# Patient Record
Sex: MALE | Race: BLACK OR AFRICAN AMERICAN | URBAN - METROPOLITAN AREA
[De-identification: names, ages, dates, MRNs, and addresses within clinical notes are randomized per-mention and may not be internally consistent; named-entity substitution may affect disease eponyms.]

---

## 2019-02-14 ENCOUNTER — OFFICE VISIT (OUTPATIENT)
Dept: FAMILY MEDICINE | Facility: CLINIC | Age: 29
End: 2019-02-14

## 2019-02-14 VITALS
RESPIRATION RATE: 12 BRPM | DIASTOLIC BLOOD PRESSURE: 84 MMHG | WEIGHT: 193.6 LBS | OXYGEN SATURATION: 98 % | SYSTOLIC BLOOD PRESSURE: 122 MMHG | BODY MASS INDEX: 25.66 KG/M2 | HEART RATE: 58 BPM | TEMPERATURE: 98.3 F | HEIGHT: 73 IN

## 2019-02-14 DIAGNOSIS — Z11.3 SCREEN FOR STD (SEXUALLY TRANSMITTED DISEASE): Primary | ICD-10-CM

## 2019-02-14 ASSESSMENT — MIFFLIN-ST. JEOR: SCORE: 1902.04

## 2019-02-14 NOTE — Clinical Note
I have completed my note, please review, add tie in statement and close encounter. Thanks! Hoyeon (Melody) KimEmail: [email or web address] [phone]

## 2019-02-15 LAB
C TRACH DNA SPEC QL NAA+PROBE: NEGATIVE
HBV SURFACE AB SERPL IA-ACNC: 10.59 M[IU]/ML
HBV SURFACE AG SERPL QL IA: NONREACTIVE
HCV AB SERPL QL IA: NONREACTIVE
HIV 1+2 AB+HIV1 P24 AG SERPL QL IA: NONREACTIVE
N GONORRHOEA DNA SPEC QL NAA+PROBE: NEGATIVE
SPECIMEN SOURCE: NORMAL
SPECIMEN SOURCE: NORMAL
T PALLIDUM AB SER QL: NONREACTIVE

## 2019-02-15 NOTE — PROGRESS NOTES
"MEDICINE NOTE    SUBJECTIVE:      Mr. William Swann is a 28 year old male with no significant past medical history or active medical problems presenting today for an STI / STD screen. Patient reports becoming recently sexually active in September 2018; he was last sexually active 14 years ago. STI/STD testing at that time was negative. Recent at-home HIV testing (December, 2018) was also negative. Patient has had 7 lifetime partners and engages in sex with men only. He reports safe sex using condoms consistently. Despite this, Mr. Swann reports being \"paranoid\" and would like to get STI/STD testing to obtain a baseline.     Mr. Swann is reportedly asymptomatic. No pertinent positive symptoms.      Meds:  Now foods digestive enzyme; Iron Supplements; Probiotics; Multivitamins (1/day)    REVIEW OF SYSTEMS:  Gen: no fevers, night sweats or weight change  Eyes: no vision change, diplopia or red eyes  Ears, Noses, Mouth, Throat: no hearing change, no epistaxis or nasal discharge, no oral lesions, throat clear  Cardiac: no chest pain, palpitations, or pain with walking  Lungs: no dyspnea, cough, or shortness of breath  GI: no nausea, vomiting, diarrhea or constipation, no abdominal pain  : no change in urine, hematuria, or sexual dysfunction  Musculoskeletal: no joint or muscle pain or swelling  Skin: no concerning lesions or moles  Neuro: no loss of strength or sensation, no numbness, occasional lower extremity tingling, no tremor  Endo: no polyuria or polydipsia, no temperature intolerance  Heme/Lymph: no concerning bumps, no bleeding problems  Allergy: no environmental allergies; seafood allergy  Psych: no depression or anxiety    No past medical history.    Surgical history: wisdom teeth extraction    Family history: none reported    Social History     Socioeconomic History     Marital status: Single   Occupational History     Not on file   Tobacco Use     Smoking status: Never   Substance and Sexual Activity     " "Alcohol use: 1x every few months     Drug use: none     Sexual activity: Active; MSM       OBJECTIVE:  Physical Exam:  /84 (BP Location: Left arm, Patient Position: Chair, Cuff Size: Adult Regular)   Pulse 58   Temp 98.3  F (36.8  C)   Resp 12   Ht 1.854 m (6' 1\")   Wt 87.8 kg (193 lb 9.6 oz)   SpO2 98%   BMI 25.54 kg/m    Constitutional: no distress, comfortable, pleasant   Eyes: anicteric, normal extra-ocular movements   Ears, Nose and Throat: throat clear, neck supple with full range of motion  Cardiovascular: regular rate and rhythm, normal S1 and S2, no murmurs, rubs or gallops   Respiratory: non-labored breathing  Gastrointestinal: positive bowel sounds, nontender  Musculoskeletal: full range of motion, no edema   Skin: no concerning lesions, no jaundice   Neurological: cranial nerves grossly intact, normal strength and sensation, normal gait, no tremor   Psychological: appropriate mood, congruent affect   Lymphatic: no cervical  lymphadenopathy    ASSESSMENT/PLAN:  Diagnoses and all orders for this visit: STD/STI screen    Patent presented requesting STD/STI screening after recently becoming sexually active. He regularly uses protection (condoms), but is a higher risk patient due to MSM practice.     Screen for STD (sexually transmitted disease)  -     Neisseria gonorrhoeae PCR  -     Chlamydia trachomatis PCR  -     Hepatitis B Surface Antibody  -     Hepatitis B surface antigen  -     Hepatitis C antibody  -     Treponema Abs w Reflex to RPR and Titer  -     Rapid HIV 1 and 2 Antigen Antibody      Med Clinician: Imtiaz Bedolla MS3  Preceptor: Dr. Gunnar Castillo MD    In supervising the medical student, I repeated the exam documented above.  I have reviewed and verified the student s documentation.  Supervising Provider: Anna Maher        "

## 2019-02-15 NOTE — PROGRESS NOTES
Patient Visit Summary    Patient Name: William Swann  MRN: [id number]    Date of Visit: 2/14/2019    Principle Diagnosis: STI/STD Screen    Physician's Recommendations/Instructions: None    Lab Tests Performed:   HepB/C  STI/STD Test for HIV, Chlamydia, Gonorrhea, Syphilis     Follow Up/Results: Clinic will call with lab results.     Referrals and Instructions: None    Physician: Imtiaz Bedolla MS3 and Dr. Gunnar Castillo

## 2019-02-15 NOTE — NURSING NOTE
Patient presented to the clinic with intent to receive STI screening. The patient reports no further complaints. The patient is currently taking enzymes and iron supplements.

## 2019-02-16 NOTE — PROGRESS NOTES
"[facility] Progress Note    Chief complaint: STI/STD screening    Last date of full med (Med Refill only):    Subjective:  Condition 1: STI/STD Screening  DONAVAN is a 28-years-old male who presents to the clinic for STI/STD screening. The patient did not have STD in the past. He was concerned about STD, so he would like to get STI/STD screening to obtain a baseline. The patient states that be recently became sexually active in September 2018. He was last sexually active 14 years ago and STI/STD screening was negative at that time. He currently has no sexual partner. He has had 7 partners in his lifetime and engages in sex with men. He reports using condoms as protection. In December 2018, he did home HIV testing and the result was negative.     He is up-to-date on immunizations but did not receive a flu shot this year. He has no past medical history and no family history of any chronic conditions. The patient is currently taking an iron supplement, Now Foods digestive enzyme, and probiotics. He takes 1 tablet of iron supplement once daily for general energy. He does not remember the strength of the iron supplement and started to take it last January. He takes 2 capsules of Now Foods enzyme daily for digestion. He started to take it 2 years ago. He takes 1-5 capsules of probiotics daily for digestion. He started to take it 2 years ago. He used to take multivitamins (one a day), but he did not take it for about a month.      Caffeine use: none  Tobacco use: none  Alcohol use: once every few months, only one drink  Illicit drugs use: none        Current Outpatient Medications   Medication Sig Dispense Refill     Ferrous Sulfate (IRON SUPPLEMENT PO) Take 1 tablet by mouth once daily           Objective:   /84 (BP Location: Left arm, Patient Position: Chair, Cuff Size: Adult Regular)   Pulse 58   Temp 98.3  F (36.8  C)   Resp 12   Ht 1.854 m (6' 1\")   Wt 87.8 kg (193 lb 9.6 oz)   SpO2 98%   BMI 25.54 kg/m  "     Assessment:     Condition 1- STI/STD screening  DTP: N/A  The patient has no drug therapy problems at this time and only wants to have STI/STD screening after recently becoming sexually active.     Plan:  1. Perform STD screening for gonorrhea, chlamydia, syphilis, Hepatitis B, Hepatitis C, and HIV.     Pharmacy Follow-Up Plan (Method, Date, Parameters):   - Follow-up via phone call with med clinician to share lab results on Monday (2/18/19).    PharmCare Clinician: Hoyeon (Melody) Kim   Pharmacy Preceptor: Tayler Hu.     _____________________________  Preceptor Use Only:  In supervising the student, I have reviewed and verified the student's documentation and found it to be correct and complete.   Preceptor Signature: Emily Puentes PharmD

## 2019-02-20 ENCOUNTER — TELEPHONE (OUTPATIENT)
Dept: FAMILY MEDICINE | Facility: CLINIC | Age: 29
End: 2019-02-20

## 2019-02-20 NOTE — TELEPHONE ENCOUNTER
Type: Outgoing Phone Call to patient  Contact: Patient, # [phone]  Outcome of the Call: No Answer.   Purpose: I was calling to inform the patient of his lab reports. I will try again within 24 hours.     -Imtiaz Bedolla, MS3

## 2019-02-21 ENCOUNTER — TELEPHONE (OUTPATIENT)
Dept: FAMILY MEDICINE | Facility: CLINIC | Age: 29
End: 2019-02-21

## 2019-02-22 NOTE — TELEPHONE ENCOUNTER
Mr. Swann called for the results of his STI tests. I informed him that all were negative, and that his positive HBV surface antibody was due to a vaccination against HBV and not active disease because of his negative antigen.

## 2019-07-11 ENCOUNTER — OFFICE VISIT (OUTPATIENT)
Dept: FAMILY MEDICINE | Facility: CLINIC | Age: 29
End: 2019-07-11

## 2019-07-11 VITALS
HEIGHT: 73 IN | HEART RATE: 54 BPM | DIASTOLIC BLOOD PRESSURE: 78 MMHG | BODY MASS INDEX: 26.92 KG/M2 | OXYGEN SATURATION: 95 % | TEMPERATURE: 98.5 F | SYSTOLIC BLOOD PRESSURE: 115 MMHG | WEIGHT: 203.1 LBS | RESPIRATION RATE: 16 BRPM

## 2019-07-11 DIAGNOSIS — Z11.3 SCREEN FOR STD (SEXUALLY TRANSMITTED DISEASE): ICD-10-CM

## 2019-07-11 DIAGNOSIS — N10 PYELONEPHRITIS, ACUTE: ICD-10-CM

## 2019-07-11 DIAGNOSIS — R30.0 DYSURIA: Primary | ICD-10-CM

## 2019-07-11 LAB
ALBUMIN UR-MCNC: NEGATIVE MG/DL
APPEARANCE UR: CLEAR
BILIRUB UR QL STRIP: NEGATIVE
COLOR UR AUTO: ABNORMAL
GLUCOSE UR STRIP-MCNC: NEGATIVE MG/DL
HGB UR QL STRIP: ABNORMAL
KETONES UR STRIP-MCNC: NEGATIVE MG/DL
LEUKOCYTE ESTERASE UR QL STRIP: ABNORMAL
MUCOUS THREADS #/AREA URNS LPF: PRESENT /LPF
NITRATE UR QL: NEGATIVE
PH UR STRIP: 7.5 PH (ref 5–7)
RBC #/AREA URNS AUTO: 1 /HPF (ref 0–2)
SOURCE: ABNORMAL
SP GR UR STRIP: 1 (ref 1–1.03)
UROBILINOGEN UR STRIP-MCNC: NORMAL MG/DL (ref 0–2)
WBC #/AREA URNS AUTO: 10 /HPF (ref 0–5)

## 2019-07-11 RX ORDER — CIPROFLOXACIN 500 MG/1
500 TABLET, FILM COATED ORAL 2 TIMES DAILY
Qty: 14 TABLET | Refills: 0 | Status: SHIPPED | OUTPATIENT
Start: 2019-07-11 | End: 2019-07-18

## 2019-07-11 SDOH — HEALTH STABILITY: MENTAL HEALTH: HOW OFTEN DO YOU HAVE A DRINK CONTAINING ALCOHOL?: 2-4 TIMES A MONTH

## 2019-07-11 ASSESSMENT — MIFFLIN-ST. JEOR: SCORE: 1948.75

## 2019-07-12 LAB
C TRACH DNA SPEC QL NAA+PROBE: NEGATIVE
HBV SURFACE AG SERPL QL IA: NONREACTIVE
HCV AB SERPL QL IA: NONREACTIVE
HIV 1+2 AB+HIV1 P24 AG SERPL QL IA: NONREACTIVE
N GONORRHOEA DNA SPEC QL NAA+PROBE: NEGATIVE
SPECIMEN SOURCE: NORMAL
SPECIMEN SOURCE: NORMAL
T PALLIDUM AB SER QL: NONREACTIVE

## 2019-07-12 NOTE — NURSING NOTE
"The pt presented to the clinic with a chief complaint of back pain. The pt reports having cramps in the left lower quadrant of the back; onset \"a few days ago; less than a week ago.\" The pt took 3 take-home UTI tests and reported that two of the tests were negative for nitrates. The pt said he went to work a few days ago and was experiencing polyuria, 'going to the bathroom every 5 minutes.' He also stated that he was experiencing pain with urination. This progressed to his current back pain which is aggravated by physical activity. He states that he does not have any itching in the genital area and has not noticed any color changes in his urine output. The patient has tried taking probiotics and cranberry juice, which he states has helped with the painful urination but not the progression to back pain.     Social hx: No caffeine, tobacco, or illicit drug use. Occasional alcohol use.     PHQ-2 Score: 0    ANEL Becerra.  "

## 2019-07-12 NOTE — PROGRESS NOTES
MEDICINE NOTE    SUBJECTIVE:  William is a 28yr old male with no significant PMHx who presents today in clinic with concern for UTI. Pt's sxs began 8 days ago with urinary frequency at work. He states he was urinating every 5 minutes. He reports associated dysuria with dark, cloudy urine that smelled like fish. Several days ago he started to have additional L low back pain that is now radiating towards the R side. Denies genital itching. He took 3 OTC Azo UTI tests, the first two testing positive for leukocytes and the last test showed positive nitrites. He has been taking Azo cranberry pills and probiotics. He has not taken any medication for pain management. No hx of UTI or kidney disease. No fam hx of kidney disease. Currently sexually active with 1 partner. Previously tested negative for STD back in 02/2019.     REVIEW OF SYSTEMS:  Gen: no fevers, night sweats, lethargic    Eyes: no vision change, diplopia  Ears, Noses, Mouth, Throat: no ringing in ears or hearing change, no sore throat, dry mouth  Cardiac: no chest pain, palpitations, or pain with walking  Lungs: no dyspnea, cough, or shortness of breath  GI: nausea. no vomiting, diarrhea or constipation, no abdominal pain  : dysuria, polyuria, dark/cloudy urine.   Musculoskeletal: low back pain  Skin: no concerning lesions or moles  Neuro: no loss of strength or sensation, no numbness or tingling, feels shaky, dizzy  Endo: no polyuria or polydipsia, no temperature intolerance  Heme/Lymph: no concerning bumps, no bleeding problems  Allergy: no environmental or drug allergies  Psych: no depression or anxiety    No past medical history on file.    No past surgical history on file.    No family history on file.    Social History     Socioeconomic History     Marital status: Single     Spouse name: Not on file     Number of children: Not on file     Years of education: Not on file     Highest education level: Not on file   Occupational History     Not on file  "  Social Needs     Financial resource strain: Not on file     Food insecurity:     Worry: Not on file     Inability: Not on file     Transportation needs:     Medical: Not on file     Non-medical: Not on file   Tobacco Use     Smoking status: Never Smoker     Smokeless tobacco: Never Used   Substance and Sexual Activity     Alcohol use: Yes     Frequency: 2-4 times a month     Drug use: Never     Sexual activity: Yes   Lifestyle     Physical activity:     Days per week: Not on file     Minutes per session: Not on file     Stress: Not on file   Relationships     Social connections:     Talks on phone: Not on file     Gets together: Not on file     Attends Sabianist service: Not on file     Active member of club or organization: Not on file     Attends meetings of clubs or organizations: Not on file     Relationship status: Not on file     Intimate partner violence:     Fear of current or ex partner: Not on file     Emotionally abused: Not on file     Physically abused: Not on file     Forced sexual activity: Not on file   Other Topics Concern     Not on file   Social History Narrative     Not on file       OBJECTIVE:  Physical Exam:  /78 (BP Location: Left arm, Patient Position: Sitting, Cuff Size: Adult Regular)   Pulse 54   Temp 98.5  F (36.9  C) (Oral)   Resp 16   Ht 1.86 m (6' 1.23\")   Wt 92.1 kg (203 lb 1.6 oz)   SpO2 95%   BMI 26.63 kg/m    Constitutional: no distress, comfortable, pleasant   Eyes: anicteric, normal extra-ocular movements   Ears, Nose and Throat: tympanic membranes clear, nose clear and free of lesions, throat clear, neck supple with full range of motion, no thyromegaly.   Cardiovascular: regular rate and rhythm, normal S1 and S2, no murmurs, rubs or gallops, peripheral pulses full and symmetric   Respiratory: clear to auscultation, no wheezes or crackles, normal breath sounds   Gastrointestinal: positive bowel sounds, nontender, no hepatosplenomegaly, no masses "   Musculoskeletal:left CVA tenderness  Skin: clamy   Neurological: normal strength  Psychological: appropriate mood  Lymphatic: no cervical  lymphadenopathy    ASSESSMENT/PLAN:  William was seen today for back pain.    Diagnoses and all orders for this visit:    Dysuria  -     UA reflex to Microscopic and Culture  -     Neisseria gonorrhoeae PCR  -     Chlamydia trachomatis PCR    Pyelonephritis, acute  -     ciprofloxacin (CIPRO) 500 MG tablet; Take 1 tablet (500 mg) by mouth 2 times daily for 7 days    Screen for STD (sexually transmitted disease)  -     HIV Antigen Antibody Combo  -     Treponema Abs w Reflex to RPR and Titer  -     Hepatitis C antibody  -     Hepatitis B surface antigen    Pt presents with x8day of polyuria, dysuria, urine discoloration, and low back pain. On exam, positive for left CVA tenderness. Vital signs WNL. UA/UC obtained. Blood drawn for STD/STI screen. Will call patient with results. For now will empirically treat with Ciprofloxacin x2/day x7days. Encouraged patient to continue drinking enough fluids.     Med Clinician: Faith Mejia, MS2  Preceptor: Dr. Adwoa Powers    In supervising the medical student, I repeated the exam documented above.  I have reviewed and verified the student s documentation.  Supervising Provider: Adwoa Powers MD

## 2019-07-12 NOTE — PROGRESS NOTES
"[facility] Progress Note    Chief complaint: Dysuria and back pain caused by UTI    Subjective:  Condition 1: UTI    Patient's symptoms began 8 days ago (7/3/19) with dysuria and increase in urinary frequency. His urine was dark, cloudy and fish-like smelled. Patient did not experience any itching in the genital area.  He took 3 AZO UTI tests on the first, second and fifth day of having symptoms. He reported that the first two test were negative for nitrates, positive for leukocytes but the last one was positive for nitrates and negative for leukocytes. Patient has no history of kidney disease. Negative STD tests in Feb 2014.     Before having UTI symptoms, patient was not on any medications, including prescription, OTCs, multivitamins and supplements. He took Azo (162mg methenamine - 162.5 sodium salicylate) from 7/4-7/7 but the condition became worse. The pain started to radiate to the lower backs, especially the left side. He also took Azo cranberry and probiotics but did not use any medication to control the pain.    Current Outpatient Medications   Medication Sig Dispense Refill     ciprofloxacin (CIPRO) 500 MG tablet Take 1 tablet (500 mg) by mouth 2 times daily for 7 days 14 tablet 0         Objective: Physical examination revealed left CVA tenderness  /78 (BP Location: Left arm, Patient Position: Sitting, Cuff Size: Adult Regular)   Pulse 54   Temp 98.5  F (36.9  C) (Oral)   Resp 16   Ht 1.86 m (6' 1.23\")   Wt 92.1 kg (203 lb 1.6 oz)   SpO2 95%   BMI 26.63 kg/m      Assessment:     Condition 1- UTI (possible pyelonephritis)   DTP: Needs Additional Therapy: untreated condition   Rationale: Patient needs additional therapy to treat his UTI. Antibiotic therapy would be effective and appropriate for patient given symptoms.    Plan:  1. Empirically treat ciprofloxacin 500mg twice daily x 7 days.  2. Encourage patient to continue drinking enough fluids and  patient on new meds.  3. Med " clinician will follow up with patient regarding lab results and instruct patient to stop taking ciprofloxacin if needed.    Pharmacy Follow-Up Plan (Method, Date, Parameters): Phone, 7/18/2019  Assess the effectiveness of ciprofloxacin in eliminating his UTI (back pain, dysuria and urine color)  Assess the safety of ciprofloxacin: Check if patient is experiencing any side effects including diarrhea, nausea and vomiting, headache and tendonitis.    PharmCare Clinician: Michelle Soler  Pharmacy Preceptor: Julieta Azevedo    ____________________________  Preceptor Use Only:  In supervising the student, I have reviewed and verified the student's documentation and found it to be correct and complete.  Preceptor Signature: Julieta Azevedo, PharmD - July 16, 2019

## 2019-07-12 NOTE — PATIENT INSTRUCTIONS
Patient Visit Summary    Patient Name: William Swann  MRN: [id number]    Date of Visit: 7/11/2019    Principle Diagnosis: Back Pain/ Kidney infection    Physician's Recommendations/Instructions: Begin taking Ciprofloxacin 500mg twice daily (once in the morning, once in the evening). Take with food, but avoid dairy products. OK to take probiotics or yogurt at lunchtime to prevent diarrhea. Avoid strenuous exercise while taking antibiotic. We will do a urinalysis and std checks today. We will call with results. If back pain does not resolve, follow up.    Lab Tests Performed: Urinalysis, Check for Chlamydia/Ghonnorhea     Follow Up/Results: Follow up as needed.    Physician: Nicolás Lenz MD

## 2020-03-11 ENCOUNTER — HEALTH MAINTENANCE LETTER (OUTPATIENT)
Age: 30
End: 2020-03-11

## 2021-01-03 ENCOUNTER — HEALTH MAINTENANCE LETTER (OUTPATIENT)
Age: 31
End: 2021-01-03

## 2021-04-25 ENCOUNTER — HEALTH MAINTENANCE LETTER (OUTPATIENT)
Age: 31
End: 2021-04-25

## 2021-10-10 ENCOUNTER — HEALTH MAINTENANCE LETTER (OUTPATIENT)
Age: 31
End: 2021-10-10

## 2022-05-21 ENCOUNTER — HEALTH MAINTENANCE LETTER (OUTPATIENT)
Age: 32
End: 2022-05-21

## 2022-07-28 ENCOUNTER — OFFICE VISIT (OUTPATIENT)
Dept: FAMILY MEDICINE | Facility: CLINIC | Age: 32
End: 2022-07-28

## 2022-07-28 VITALS
OXYGEN SATURATION: 96 % | HEIGHT: 72 IN | TEMPERATURE: 98.2 F | SYSTOLIC BLOOD PRESSURE: 129 MMHG | DIASTOLIC BLOOD PRESSURE: 88 MMHG | RESPIRATION RATE: 20 BRPM | BODY MASS INDEX: 30.31 KG/M2 | HEART RATE: 63 BPM | WEIGHT: 223.8 LBS

## 2022-07-28 DIAGNOSIS — Z11.3 ROUTINE SCREENING FOR STI (SEXUALLY TRANSMITTED INFECTION): Primary | ICD-10-CM

## 2022-07-28 ASSESSMENT — PAIN SCALES - GENERAL: PAINLEVEL: NO PAIN (0)

## 2022-07-28 NOTE — PROGRESS NOTES
"[facility] Progress Note    Chief complaint: STD Screening     Last date of full med: 07/28/2022    Subjective:  Patient is a 32 year old male who presents to [facility] requesting STD screening and is asymptomatic. Patient reports his last doctor visit was at [facility] about 3 years ago. Within the last year he has had 3 sexual partners and has used protection. He reports using at home STD testing about a month ago and reports it was negative. Patient has no known drug allergies and denies taking any medications including OTC medications. He denies caffeine, tobacco, and drug use. He reports drinking a couple alcoholic drinks every few weeks on social occasions.       No current outpatient medications on file.         Objective:  /88 (BP Location: Right arm, Patient Position: Sitting, Cuff Size: Adult Regular)   Pulse 63   Temp 98.2  F (36.8  C) (Temporal)   Resp 20   Ht 1.83 m (6' 0.05\")   Wt 101.5 kg (223 lb 12.8 oz)   SpO2 96%   BMI 30.31 kg/m        Assessment:     DTP: none  Rationale: Patient has no untreated conditions and no complaints at this time.       Plan:  Order Lab STD screening (urine test for gonorrhea, chlamydia, and blood test for hep b and c, HIV, syphilis)   Med clinician will follow up with patient once results are available     Follow-Up:  Come back to [facility] as needed    Pharmacy Clinician: Malcolm Lewis    I am signing this for the preceptor who has been unable to sign this note in a timely manner.  The content of this note has been reviewed by the preceptor for accuracy.  I did not participate in the care of this patient.  Boom Bui MD - Medical Director, [facility]      "

## 2022-07-28 NOTE — NURSING NOTE
"[facility] Nursing Progress Note    Chief complaint: Interested in getting tested for STD's. Patient reports having 3 sexual partners since January. Patient is asymptomatic and is not aware of any known exposures; he just wants to get tested for \"peace of mind.\" Patient has sex with men and uses condoms.     Objective:  /88 (BP Location: Right arm, Patient Position: Sitting, Cuff Size: Adult Regular)   Pulse 63   Temp 98.2  F (36.8  C) (Temporal)   Resp 20   Ht 1.83 m (6' 0.05\")   Wt 101.5 kg (223 lb 12.8 oz)   SpO2 96%   BMI 30.31 kg/m          PHQ Score:    PHQ2: 0    Nutrition Screener:    Q1 Answer: Never true    Q2 Answer: Never true    Referral to Nutrition needed?: No      Nursing Clinician: Marry Padilla  Nursing Preceptor: Silvana Warner    _____________________________  Preceptor Use Only:  In supervising the student, I have reviewed and verified the student's documentation and found it to be correct and complete.   Preceptor Signature: Silvana Warner RN    "

## 2022-07-29 NOTE — PATIENT INSTRUCTIONS
Patient Visit Summary    Patient Name: William Swann  MRN: [id number]    Date of Visit: 7/28/2022    Principle Diagnosis: N/A, routine STI/STD testing    Physician's Recommendations/Instructions: N/A, please reference [facility] pamphlets on HIV Prep and local clinics that provide free STI/STD testing.    Lab Tests Performed: HIV, Chlamydia, Syphilis, Gonorrhea, Hepatitis B/C    Follow Up/Results: Med Clinician will call in 3-4 days with test results.    Referrals and Instructions: N/A, wait for telephone follow up.    Physician: Aron Coughlin MD

## 2022-07-29 NOTE — PROGRESS NOTES
MEDICINE NOTE    SUBJECTIVE:  William is a 33 yo M who came into [facility] today to receive STI testing. Pt currently is experiencing no  symptoms, has no concerns for UTI or STI, and is interested in testing for piece of mind. Pt is currently sexually active as of 01/22, before then last sexual partner was in 2019.  Over the last 6mo, Pt has had 3 sexual partners (identified as men) and uses condoms. Since 01/22, Pt has self-tested for STI using OTC test kits, all results have been negative. Pt does not endorse having a prior STI.     REVIEW OF SYSTEMS:  Gen: no fevers, night sweats or weight change  Eyes: no vision change, diplopia or red eyes  Ears, Noses, Mouth, Throat: no ringing in ears or hearing change, no epistaxis or nasal discharge, no oral lesions, throat clear  Cardiac: no chest pain, palpitations, or pain with walking  Lungs: no dyspnea, cough, or shortness of breath  GI: no nausea, vomiting, diarrhea or constipation, no abdominal pain  : no change in urine, hematuria, or sexual dysfunction  Musculoskeletal: no joint or muscle pain or swelling  Skin: no concerning lesions or moles  Neuro: no loss of strength or sensation, no numbness or tingling, no tremor  Endo: no polyuria or polydipsia, no temperature intolerance  Heme/Lymph: no concerning bumps, no bleeding problems  Allergy: no environmental or drug allergies  Psych: no depression or anxiety    Social History     Socioeconomic History     Marital status: Single     Spouse name: Not on file     Number of children: Not on file     Years of education: Not on file     Highest education level: Not on file   Occupational History     Not on file   Tobacco Use     Smoking status: Never Smoker     Smokeless tobacco: Never Used   Substance and Sexual Activity     Alcohol use: Yes     Drug use: Never     Sexual activity: Yes   Other Topics Concern     Not on file   Social History Narrative     Not on file     Social Determinants of  "Health     Financial Resource Strain: Not on file   Food Insecurity: Not on file   Transportation Needs: Not on file   Physical Activity: Not on file   Stress: Not on file   Social Connections: Not on file   Intimate Partner Violence: Not on file   Housing Stability: Not on file       OBJECTIVE:  Physical Exam:  /88 (BP Location: Right arm, Patient Position: Sitting, Cuff Size: Adult Regular)   Pulse 63   Temp 98.2  F (36.8  C) (Temporal)   Resp 20   Ht 1.83 m (6' 0.05\")   Wt 101.5 kg (223 lb 12.8 oz)   SpO2 96%   BMI 30.31 kg/m    Constitutional: no distress, comfortable, pleasant   Psychological: appropriate mood     ASSESSMENT/PLAN:  William was seen today for sti screening.    DONAVAN is a 33 yo M who presents to clinic for routine STI testing. We discussed continued safe sex practices (using condomes, consider pre-exposure prophylaxis in the future). Next steps include routine STI testing for HIV, Treponema, Hepatitis C, Hepatitis B, N. gonorrhoeae, and Chlamydia trachomatis.      Routine screening for STI (sexually transmitted infection)  -     Hepatitis B Surface Antibody  -     HIV Antigen Antibody Combo  -     Hepatitis C antibody  -     Treponema Abs w Reflex to RPR and Titer  -     Chlamydia trachomatis/Neisseria gonorrhoeae by PCR - Clinic Collect    Med Clinician: Demian Cortez  Preceptor: Aron Coughlin MD    In supervising the medical student, I repeated the exam documented above.  I have reviewed and verified the student s documentation.  Supervising Provider: Aron Coughlin MD      "

## 2022-08-02 ENCOUNTER — TELEPHONE (OUTPATIENT)
Dept: FAMILY MEDICINE | Facility: CLINIC | Age: 32
End: 2022-08-02

## 2022-09-18 ENCOUNTER — HEALTH MAINTENANCE LETTER (OUTPATIENT)
Age: 32
End: 2022-09-18

## 2023-06-04 ENCOUNTER — HEALTH MAINTENANCE LETTER (OUTPATIENT)
Age: 33
End: 2023-06-04

## 2024-07-14 ENCOUNTER — HEALTH MAINTENANCE LETTER (OUTPATIENT)
Age: 34
End: 2024-07-14

## 2025-07-19 ENCOUNTER — HEALTH MAINTENANCE LETTER (OUTPATIENT)
Age: 35
End: 2025-07-19